# Patient Record
(demographics unavailable — no encounter records)

---

## 2024-11-26 NOTE — HISTORY OF PRESENT ILLNESS
[FreeTextEntry1] : 44F with PMH of DM2 (diagnosed at age 2004, on basaglar, metformin, repaglinide), HTN, and HLD, referred by Dr. Maximo Aviles for elevated lipase and amylase levels. Patient states that overall she has been doing well. Does experience some acid reflux symptoms with particular foods. Also has some vomiting after meals (says this happens roughly 1x per month) if the 'food does not agree with her.' Went to the ER 8 years ago and was diagnosed with gastritis but states that her symptoms improved on PPI. Currently is not taking a PPI. Does not think she was ever tested for H. pylori. States that her GERD has not been that severe or disruptive to her life. Occasionally has to take Tums but feels this alone controls her symptoms for the most part. Symptoms were exacerbated by Ozempic, so she quit taking Ozempic.   PMH: see above  PSH: C-sections (2002 and 2007)  Meds: basaglar, metformin, repaglinide, atorvastatin, and losartan  Allergies: seasonal  FH: maternal grandmother had colon cancer, otherwise no FH of GI cancers/polyps or IBD  SH: previous smoker and social drinker, but now does not drink, smoke; no prior drug use, works in healthcare, was born in [place]   Recent lab work (10/25/2024):  - Na 140 ,K 3.9, BUN/Cr 13/0.65, albumin 4.7, bili 0.6, AST/ALT 20/19 - alpha-1 antitrypsin 105 - CRP <0.3 - RF <10 and IgG panel WNL  - CBC from 07/23/24: WBC 6.80, H/H 13.6/46, plt 275 - HbA1c 7.4  - lipase 395 and amylase 220   Underwent MR abdomen 05/30/24:  - mild hepatic steatosis  - unremarkable pancreas   EGD: none prior  Colonoscopy: none prior

## 2024-11-26 NOTE — END OF VISIT
[] : Fellow [FreeTextEntry3] : Pt seen and d/w fellow.  Will check H. pylori breath test.  Recheck amylase, lipase, macroamylase.  EGD and colonoscopy for GERD/ CRC screening, f/u in three months.

## 2024-11-26 NOTE — ASSESSMENT
[FreeTextEntry1] : 44F with PMH of DM2 (diagnosed at age 2004, on basaglar, metformin, repaglinide), HTN, and HLD, referred by Dr. Maximo Aviles for elevated lipase and amylase levels.  Reviewed most recent MR abdomen (May 2024) showing normal pancreas.   Plan:  - discussed lifestyle modifications for GERD (symptoms currently well controlled on Tums)  - f/u H. pylori breath test from today  - f/u CMP, CBC, lipase, amylase, and macro amylase  - will plan for EGD for GERD/vomiting and colonoscopy for CRC screening  - discussed risk/benefits of colonoscopy including infection, bleeding, perforation, or a missed lesion - discussed CLD, NPO except medications after midnight the night before, and need for escort home from procedure - patient wishes to think about procedures more prior to scheduling  - RTC in 3 months to discuss results and arrange EGD/colonoscopy

## 2025-05-13 NOTE — ASSESSMENT
[FreeTextEntry1] : 45 year old woman, referred by Neurosurgery Dr. Lenore Hudson, pmhx of T2DM (on Insulin Basaglar), presenting for initial consult for spinal stenosis. The patient's symptoms of left-sided lower back pain radiating down the lateral left  leg, associated numbness and tingling, and positive straight leg raise test and cross straight leg raise test are consistent with lumbar radiculopathy. The recent exacerbation following a specific movement (pulling up pants) suggests disc involvement. MRI confirm diagnosis and identify specific level of nerve root compression at L4-L5.  The patient has failed rest, activity modification, 6 weeks of physician directed physical therapy, NSAIDs, muscle relaxants, and neuropathic medications.  They have also failed acupuncture, chiropractic care, and surgical evaluation. At this point an interventional therapy targeted at alleviating their pain and improving their functionality is a reasonable treatment option.  Plan: Recommend LESI L4-L5.  Discussed risks regarding T2DM The potential benefits as well as rare but possible risks were reviewed with the patient.  These risks including infection including epidural abscess, meningitis, osteomyelitis, and discitis, bleeding including epidural hematoma, nerve injury, paralysis, failure to relieve pain or worse pain, headache, pneumothorax, elevated blood sugars, allergic reactions, adverse reactions to medications, vasovagal reactions, falls, etc. Following that discussion, we decided together that the potential benefits outweigh the potential risks and we decided to proceed with scheduling the procedure.  I answered all the patient's questions about the procedure including the technical details of the procedure and also offered that if any other questions arise, we will also be happy to answer those on the day of the procedure. All questions today were answered to the patient's satisfaction, and the patient stated their verbal understanding. - Patient wishes to proceed  - Follow up in 2 weeks

## 2025-05-13 NOTE — PHYSICAL EXAM
[Normal] : Regular, no tachycardia [Normal muscle bulk without asymmetry] : normal muscle bulk without asymmetry [Spinous Process Tenderness] : spinous process tenderness [Facet Tenderness] : facet tenderness [Antalgic] : antalgic [SLR] : positive straight leg raise [Alegre's Test] : positive Alegre's Test [NL] : Motor strength of the right lower extremity was normal [Motor Strength Lower Extremities] : left (5/5) [___/5] : left ([unfilled]/5) [] : Sensory: [L4-LT] : L4 [L5-LT] : L5 [Achilles] : Achilles 2+ and symmetric bilaterally [de-identified] : limited extension and flexion of lumbar spine  [de-identified] : left foot drop

## 2025-05-13 NOTE — DATA REVIEWED
[FreeTextEntry1] : Lumbar MRI @[facility] 01/2025  IMPRESSION: Since prevous lumbar MRI in 2016, the patient has developed a new large superiorly extruded left paracentral component of a previously identified disc herniation at the L4-L5 level. There is associated severe spinal canal stenosis. A central and slightly right paracentral disc protrusion at L2-L3 is also new. Disc protrusions present at L5-S1 and L3-L4.
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
## 2025-05-27 NOTE — PHYSICAL EXAM
[Normal] : Well developed, in no acute distress, alert and oriented to person, place and time [Normal muscle bulk without asymmetry] : normal muscle bulk without asymmetry [Cane] : ambulates with cane [de-identified] : 5/5 Strength right lower extremity 4+/5 strength throughout left lower extremity Normal right straight leg raise Positive left straight leg raise at 45 degrees

## 2025-05-27 NOTE — ASSESSMENT
[FreeTextEntry1] : Patient is a 45-year-old female with L4-L5 herniated disc and severe spinal canal stenosis who presents for follow up 2 weeks after LESI without significant improvement in her symptoms.   Will perform repeat LESI in office today. Patient is agreeable and wants to try again today.

## 2025-06-19 NOTE — PHYSICAL EXAM
[de-identified] : General:  Appearance is consistent with chronologic age. Cognitive/Language:  Awake, alert, and oriented to person, place, time and date Respiratory: Non-labored breathing, no audible wheezes Inspection: normal muscle bulk without asymmetry and normal spine curvature Motor examination:  Lower extremities: L 4/5 (pain limited), R 5/5 Sensory examination:   Intact to light touch throughout Gait: antalgic Special tests: Positive SLR

## 2025-06-19 NOTE — ASSESSMENT
[FreeTextEntry1] : 45F, initially referred by Neurosurgery Dr. Lenore Hudson, with a PMH of T2DM (on Insulin Basaglar), presenting for follow up of chronic low back pain (s/p LESI x2 at L4-5).  Impression - Patient reports continued low back pain localized to the left buttock and associated with radiation into the posterolateral aspect of the LLE down to the foot. She did not get any relief of her pain with the most recent LESI. Her pain is slightly worse and she is experiencing more muscle spasms in the calf as well as subjective weakness. Given the absence of improvement after most recent LESI, I am less inclined to offer more ESIs. She would likely benefit from surgical intervention.  Plan - Follow up with surgeon to discuss possible discectomy

## 2025-06-19 NOTE — HISTORY OF PRESENT ILLNESS
[FreeTextEntry1] : Patient is a 45F, initially referred by Neurosurgery Dr. Lenore Hudson, with a PMH of T2DM (on Insulin Basaglar), presenting for follow up of chronic low back pain (s/p LESI x2 at L4-5).  Today - Patient reports continued low back pain localized to the left buttock and associated with radiation into the posterolateral aspect of the LLE down to the foot - She did not get any relief of her pain with the most recent LESI - Her pain is slightly worse and she is experiencing more muscle spasms in the calf as well as subjective weakness - She has continued to manage her pain with Gabapentin and Tylenol